# Patient Record
Sex: MALE | Race: WHITE | Employment: FULL TIME | URBAN - METROPOLITAN AREA
[De-identification: names, ages, dates, MRNs, and addresses within clinical notes are randomized per-mention and may not be internally consistent; named-entity substitution may affect disease eponyms.]

---

## 2017-06-14 ENCOUNTER — APPOINTMENT (OUTPATIENT)
Dept: OTHER | Facility: HOSPITAL | Age: 37
End: 2017-06-14
Attending: ORTHOPAEDIC SURGERY

## 2020-04-30 ENCOUNTER — APPOINTMENT (OUTPATIENT)
Dept: GENERAL RADIOLOGY | Facility: HOSPITAL | Age: 40
End: 2020-04-30
Attending: EMERGENCY MEDICINE
Payer: OTHER MISCELLANEOUS

## 2020-04-30 ENCOUNTER — HOSPITAL ENCOUNTER (EMERGENCY)
Facility: HOSPITAL | Age: 40
Discharge: HOME | End: 2020-04-30
Attending: EMERGENCY MEDICINE
Payer: OTHER MISCELLANEOUS

## 2020-04-30 VITALS
TEMPERATURE: 98 F | HEART RATE: 81 BPM | OXYGEN SATURATION: 96 % | SYSTOLIC BLOOD PRESSURE: 153 MMHG | BODY MASS INDEX: 31.15 KG/M2 | RESPIRATION RATE: 18 BRPM | DIASTOLIC BLOOD PRESSURE: 73 MMHG | HEIGHT: 72 IN | WEIGHT: 230 LBS

## 2020-04-30 DIAGNOSIS — S66.911A HAND STRAIN, RIGHT, INITIAL ENCOUNTER: Primary | ICD-10-CM

## 2020-04-30 PROCEDURE — 99283 EMERGENCY DEPT VISIT LOW MDM: CPT

## 2020-04-30 PROCEDURE — 73130 X-RAY EXAM OF HAND: CPT | Performed by: EMERGENCY MEDICINE

## 2020-05-01 NOTE — ED PROVIDER NOTES
Patient Seen in: [facility] AND [facility] Emergency Department      History   Patient presents with:  Upper Extremity Injury    Stated Complaint: right hand injury    HPI    Patient presents to the emergency department after a right hand injury.   He states that Musculoskeletal:      Comments: The right hand has intrinsic muscle of the hand which are normal and functional.  Capillary fill brisk and less than 2 seconds. Normal sensation light touch. Radial and ulnar arterial pulses are intact.   Capillary fill i

## 2020-05-04 ENCOUNTER — APPOINTMENT (OUTPATIENT)
Dept: OTHER | Facility: HOSPITAL | Age: 40
End: 2020-05-04
Attending: EMERGENCY MEDICINE

## 2020-11-06 ENCOUNTER — HOSPITAL ENCOUNTER (EMERGENCY)
Facility: HOSPITAL | Age: 40
Discharge: HOME | End: 2020-11-06
Payer: OTHER MISCELLANEOUS

## 2020-11-06 VITALS
TEMPERATURE: 99 F | DIASTOLIC BLOOD PRESSURE: 84 MMHG | SYSTOLIC BLOOD PRESSURE: 138 MMHG | OXYGEN SATURATION: 95 % | HEART RATE: 90 BPM | RESPIRATION RATE: 18 BRPM

## 2020-11-06 DIAGNOSIS — Z91.89 AT INCREASED RISK OF EXPOSURE TO COVID-19 VIRUS: Primary | ICD-10-CM

## 2020-11-06 PROCEDURE — 99283 EMERGENCY DEPT VISIT LOW MDM: CPT

## 2020-11-07 NOTE — ED INITIAL ASSESSMENT (HPI)
Pt here for testing for work, pt c/o \"what feels like a sinus infection\" pt also reports headache today.

## 2020-11-07 NOTE — ED PROVIDER NOTES
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      Palpations: Abdomen is soft. Musculoskeletal: Normal range of motion. General: No tenderness or deformity. Skin:     General: Skin is warm and dry. Capillary Refill: Capillary refill takes less than 2 seconds.       Finding

## 2021-11-09 ENCOUNTER — HOSPITAL ENCOUNTER (EMERGENCY)
Facility: HOSPITAL | Age: 41
Discharge: HOME | End: 2021-11-09
Payer: COMMERCIAL

## 2021-11-09 VITALS
RESPIRATION RATE: 18 BRPM | TEMPERATURE: 98 F | DIASTOLIC BLOOD PRESSURE: 84 MMHG | OXYGEN SATURATION: 99 % | SYSTOLIC BLOOD PRESSURE: 130 MMHG | HEART RATE: 78 BPM

## 2021-11-09 DIAGNOSIS — Z20.822 CLOSE EXPOSURE TO COVID-19 VIRUS: Primary | ICD-10-CM

## 2021-11-09 DIAGNOSIS — J02.9 VIRAL PHARYNGITIS: ICD-10-CM

## 2021-11-09 PROCEDURE — 99283 EMERGENCY DEPT VISIT LOW MDM: CPT

## 2021-11-09 PROCEDURE — 87880 STREP A ASSAY W/OPTIC: CPT

## 2021-11-09 NOTE — ED PROVIDER NOTES
Patient Seen in: [facility] AND [facility] Emergency Department      History   No chief complaint on file. Stated Complaint: Testing     Subjective:   42yo/m with no chronic medical problems reports to the ED With complaints of sore throat, congestion.  Report motion. Cervical back: Normal range of motion and neck supple. Skin:     General: Skin is warm and dry. Capillary Refill: Capillary refill takes less than 2 seconds. Findings: No rash.       Comments: Normal color   Neurological:      Menta

## 2022-01-19 ENCOUNTER — HOSPITAL ENCOUNTER (OUTPATIENT)
Dept: GENERAL RADIOLOGY | Facility: HOSPITAL | Age: 42
Discharge: HOME | End: 2022-01-19
Attending: EMERGENCY MEDICINE

## 2022-01-19 ENCOUNTER — OFFICE VISIT (OUTPATIENT)
Dept: OTHER | Facility: HOSPITAL | Age: 42
End: 2022-01-19
Attending: EMERGENCY MEDICINE

## 2022-01-19 DIAGNOSIS — R52 PAIN: Primary | ICD-10-CM

## 2022-01-19 DIAGNOSIS — R52 PAIN: ICD-10-CM

## 2022-01-19 PROCEDURE — 73564 X-RAY EXAM KNEE 4 OR MORE: CPT | Performed by: EMERGENCY MEDICINE

## 2024-03-14 ENCOUNTER — APPOINTMENT (OUTPATIENT)
Dept: MRI IMAGING | Facility: HOSPITAL | Age: 44
End: 2024-03-14
Attending: EMERGENCY MEDICINE
Payer: COMMERCIAL

## 2024-03-14 ENCOUNTER — HOSPITAL ENCOUNTER (EMERGENCY)
Facility: HOSPITAL | Age: 44
Discharge: HOME | End: 2024-03-14
Attending: EMERGENCY MEDICINE
Payer: COMMERCIAL

## 2024-03-14 VITALS
HEART RATE: 60 BPM | OXYGEN SATURATION: 94 % | SYSTOLIC BLOOD PRESSURE: 123 MMHG | TEMPERATURE: 98 F | RESPIRATION RATE: 18 BRPM | DIASTOLIC BLOOD PRESSURE: 84 MMHG

## 2024-03-14 DIAGNOSIS — R42 DIZZINESS: Primary | ICD-10-CM

## 2024-03-14 LAB
ALBUMIN SERPL-MCNC: 4.7 G/DL (ref 3.2–4.8)
ALBUMIN/GLOB SERPL: 1.7 {RATIO} (ref 1–2)
ALP LIVER SERPL-CCNC: 58 U/L
ALT SERPL-CCNC: 94 U/L
ANION GAP SERPL CALC-SCNC: 10 MMOL/L (ref 0–18)
AST SERPL-CCNC: 77 U/L (ref ?–34)
BASOPHILS # BLD AUTO: 0.04 X10(3) UL (ref 0–0.2)
BASOPHILS NFR BLD AUTO: 0.7 %
BILIRUB SERPL-MCNC: 0.7 MG/DL (ref 0.3–1.2)
BUN BLD-MCNC: 12 MG/DL (ref 9–23)
BUN/CREAT SERPL: 10.4 (ref 10–20)
CALCIUM BLD-MCNC: 9.6 MG/DL (ref 8.7–10.4)
CHLORIDE SERPL-SCNC: 105 MMOL/L (ref 98–112)
CO2 SERPL-SCNC: 24 MMOL/L (ref 21–32)
CREAT BLD-MCNC: 1.15 MG/DL
DEPRECATED RDW RBC AUTO: 40.5 FL (ref 35.1–46.3)
EGFRCR SERPLBLD CKD-EPI 2021: 80 ML/MIN/1.73M2 (ref 60–?)
EOSINOPHIL # BLD AUTO: 0.41 X10(3) UL (ref 0–0.7)
EOSINOPHIL NFR BLD AUTO: 6.8 %
ERYTHROCYTE [DISTWIDTH] IN BLOOD BY AUTOMATED COUNT: 12.3 % (ref 11–15)
GLOBULIN PLAS-MCNC: 2.8 G/DL (ref 2.8–4.4)
GLUCOSE BLD-MCNC: 87 MG/DL (ref 70–99)
GLUCOSE BLDC GLUCOMTR-MCNC: 86 MG/DL (ref 70–99)
HCT VFR BLD AUTO: 44 %
HGB BLD-MCNC: 16 G/DL
IMM GRANULOCYTES # BLD AUTO: 0.02 X10(3) UL (ref 0–1)
IMM GRANULOCYTES NFR BLD: 0.3 %
LYMPHOCYTES # BLD AUTO: 2.11 X10(3) UL (ref 1–4)
LYMPHOCYTES NFR BLD AUTO: 34.8 %
MCH RBC QN AUTO: 32.6 PG (ref 26–34)
MCHC RBC AUTO-ENTMCNC: 36.4 G/DL (ref 31–37)
MCV RBC AUTO: 89.6 FL
MONOCYTES # BLD AUTO: 0.59 X10(3) UL (ref 0.1–1)
MONOCYTES NFR BLD AUTO: 9.7 %
NEUTROPHILS # BLD AUTO: 2.89 X10 (3) UL (ref 1.5–7.7)
NEUTROPHILS # BLD AUTO: 2.89 X10(3) UL (ref 1.5–7.7)
NEUTROPHILS NFR BLD AUTO: 47.7 %
OSMOLALITY SERPL CALC.SUM OF ELEC: 287 MOSM/KG (ref 275–295)
PLATELET # BLD AUTO: 256 10(3)UL (ref 150–450)
POTASSIUM SERPL-SCNC: 3.5 MMOL/L (ref 3.5–5.1)
PROT SERPL-MCNC: 7.5 G/DL (ref 5.7–8.2)
RBC # BLD AUTO: 4.91 X10(6)UL
SODIUM SERPL-SCNC: 139 MMOL/L (ref 136–145)
TROPONIN I SERPL HS-MCNC: <3 NG/L
WBC # BLD AUTO: 6.1 X10(3) UL (ref 4–11)

## 2024-03-14 PROCEDURE — 36415 COLL VENOUS BLD VENIPUNCTURE: CPT

## 2024-03-14 PROCEDURE — 84484 ASSAY OF TROPONIN QUANT: CPT | Performed by: EMERGENCY MEDICINE

## 2024-03-14 PROCEDURE — 93010 ELECTROCARDIOGRAM REPORT: CPT

## 2024-03-14 PROCEDURE — 99284 EMERGENCY DEPT VISIT MOD MDM: CPT

## 2024-03-14 PROCEDURE — 70551 MRI BRAIN STEM W/O DYE: CPT | Performed by: EMERGENCY MEDICINE

## 2024-03-14 PROCEDURE — 82962 GLUCOSE BLOOD TEST: CPT

## 2024-03-14 PROCEDURE — 85025 COMPLETE CBC W/AUTO DIFF WBC: CPT | Performed by: EMERGENCY MEDICINE

## 2024-03-14 PROCEDURE — 80053 COMPREHEN METABOLIC PANEL: CPT | Performed by: EMERGENCY MEDICINE

## 2024-03-14 PROCEDURE — 93005 ELECTROCARDIOGRAM TRACING: CPT

## 2024-03-14 RX ORDER — OMEPRAZOLE 40 MG/1
40 CAPSULE, DELAYED RELEASE ORAL
COMMUNITY
Start: 2024-02-01

## 2024-03-14 RX ORDER — MECLIZINE HYDROCHLORIDE 25 MG/1
25 TABLET ORAL ONCE
Status: COMPLETED | OUTPATIENT
Start: 2024-03-14 | End: 2024-03-14

## 2024-03-14 NOTE — ED INITIAL ASSESSMENT (HPI)
Pt presents to ed with c/o dizziness. Pt states he started to feel dizzy and lightheaded 3 hrs ago and has not gone away. Pt states his left shoulder feels tight.  Denies n/v/d or fevers. Denies injury to the shoulder. Speech clear, face symmetrical, strength equal bilaterally, denies numbness or tingling. Pt ambulating with steady gait in triage.    Hx: migraines, pt states this feels different from his migraines.

## 2024-03-14 NOTE — ED PROVIDER NOTES
Patient Seen in: [facility] Emergency Department      History     Chief Complaint   Patient presents with    Dizziness     Stated Complaint: dizzy    Subjective:   HPI    44-year-old male with history of acid reflux and migraine headaches presents with complaints of dizziness for the past several hours.  The patient reports a sensation of lightheadedness along with a sensation of movement.  He reports onset of symptoms 3 to 4 hours ago while he was at work as a .  The patient states that he was driving a car at the onset of symptoms.  He believes may be that he was feeling this way because he had not eaten.  He then ate lunch but continued to have symptoms.  He denies associated vision or speech changes.  He denies focal weakness or numbness.  He is without complaints of any pain including headache or chest pain.  He denies dyspnea.  He denies palpitations.  He reports feeling well prior to the onset of symptoms earlier today.    Objective:   Past Medical History:   Diagnosis Date    Acid reflux     Migraines               History reviewed. No pertinent surgical history.             Social History     Socioeconomic History    Marital status:    Tobacco Use    Smoking status: Former    Smokeless tobacco: Never   Vaping Use    Vaping Use: Never used   Substance and Sexual Activity    Alcohol use: Yes     Comment: occ    Drug use: Never              Review of Systems    Positive for stated complaint: dizzy  Other systems are as noted in HPI.  Constitutional and vital signs reviewed.      All other systems reviewed and negative except as noted above.    Physical Exam     ED Triage Vitals [03/14/24 1542]   /89   Pulse 77   Resp 18   Temp 97.6 °F (36.4 °C)   Temp src Temporal   SpO2 95 %   O2 Device None (Room air)       Current:/84   Pulse 60   Temp 97.6 °F (36.4 °C) (Temporal)   Resp 18   SpO2 94%         Physical Exam      General Appearance: alert, no distress  Eyes:  pupils equal and round no pallor or injection  ENT, Mouth: mucous membranes moist  Respiratory: there are no retractions, lungs are clear to auscultation  Cardiovascular: regular rate and rhythm  Gastrointestinal:  abdomen is soft and non tender, no masses, bowel sounds normal  Neurological: Speech normal.  Cranial nerves II through XII intact.  No focal motor or sensory deficits to extremities x 4.  Cerebellum intact to finger-to-nose.  Skin: warm and dry, no rashes.  Musculoskeletal: neck is supple non tender        Extremities are symmetrical, full range of motion  Psychiatric: patient is oriented X 3, there is no agitation    DIFFERENTIAL DIAGNOSIS: After history and physical exam differential diagnosis was considered for vertigo, anemia, electrolyte imbalance, migraine headache, CVA, or other      ED Course     Labs Reviewed   COMP METABOLIC PANEL (14) - Abnormal; Notable for the following components:       Result Value    ALT 94 (*)     AST 77 (*)     All other components within normal limits   TROPONIN I HIGH SENSITIVITY - Normal   POCT GLUCOSE - Normal   CBC WITH DIFFERENTIAL WITH PLATELET    Narrative:     The following orders were created for panel order CBC With Differential With Platelet.  Procedure                               Abnormality         Status                     ---------                               -----------         ------                     CBC W/ DIFFERENTIAL[[id number]]                              Final result                 Please view results for these tests on the individual orders.   CBC W/ DIFFERENTIAL     EKG    Rate, intervals and axes as noted on EKG Report.  Rate: 72  Rhythm: Sinus Rhythm  Axis: Normal  Reading: Normal EKG                        MDM      Lab, EKG, and MRI results noted.  Patient reports no improvement post meclizine.  Continues with some mild lightheadedness.  No cause of the patient's symptoms found.  Will discharge home with recommendations to follow-up  with his primary physician for further evaluation.  He is advised to return if increased lightheadedness, fainting, or new neurologic symptoms develop.                                   Medical Decision Making      Disposition and Plan     Clinical Impression:  1. Dizziness         Disposition:  Discharge  3/14/2024  7:14 pm    Follow-up:  own physician    Follow up            Medications Prescribed:  Current Discharge Medication List

## 2024-03-15 LAB
ATRIAL RATE: 72 BPM
P AXIS: 45 DEGREES
P-R INTERVAL: 164 MS
Q-T INTERVAL: 394 MS
QRS DURATION: 94 MS
QTC CALCULATION (BEZET): 431 MS
R AXIS: -19 DEGREES
T AXIS: 26 DEGREES
VENTRICULAR RATE: 72 BPM

## 2024-03-15 NOTE — DISCHARGE INSTRUCTIONS
Follow-up with your primary physician.  Return to the emergency department if increased lightheadedness, fainting, focal weakness, vision changes, or other new symptoms develop.